# Patient Record
Sex: MALE | Race: WHITE | NOT HISPANIC OR LATINO | Employment: FULL TIME | URBAN - METROPOLITAN AREA
[De-identification: names, ages, dates, MRNs, and addresses within clinical notes are randomized per-mention and may not be internally consistent; named-entity substitution may affect disease eponyms.]

---

## 2019-03-21 ENCOUNTER — OFFICE VISIT (OUTPATIENT)
Dept: URGENT CARE | Facility: URGENT CARE | Age: 49
End: 2019-03-21
Payer: COMMERCIAL

## 2019-03-21 VITALS
BODY MASS INDEX: 29.45 KG/M2 | WEIGHT: 180 LBS | DIASTOLIC BLOOD PRESSURE: 85 MMHG | SYSTOLIC BLOOD PRESSURE: 134 MMHG | TEMPERATURE: 98.3 F | HEART RATE: 101 BPM | OXYGEN SATURATION: 97 %

## 2019-03-21 DIAGNOSIS — H57.89 REDNESS OF LEFT EYE: Primary | ICD-10-CM

## 2019-03-21 PROCEDURE — 99213 OFFICE O/P EST LOW 20 MIN: CPT | Performed by: FAMILY MEDICINE

## 2019-03-22 NOTE — PROGRESS NOTES
SUBJECTIVE:     Chief Complaint   Patient presents with     Urgent Care     Conjunctivitis     c/o pink eye for 1 day     History of Present Illness:  Keshawn Smith is a 48 year old male who presents complaining of left eye symptoms including light sensitivity and feeling that something was in the eye initially since waking yesterday morning.  He checked and did not see anything in the eye.  Now more of a mild stinging feeling in the eye at times and it is red today.  No severe eye pain.   No vision changes noticed.  He does recall getting a finger in the eye about 5 days ago from either himself or his child, but no pain at that time, very mild poke.   Was not doing any wood or metal work.  Symptoms feel like they are improving since onset.   Last eye appointment several years ago  Associated Signs and Symptoms: mild uri symptoms.  Contact wearer : No    ALLERGIES:  No known drug allergies    ROS:  Review of systems negative except as stated above.    OBJECTIVE:  /85   Pulse 101   Temp 98.3  F (36.8  C) (Tympanic)   Wt 81.6 kg (180 lb)   SpO2 97%   BMI 29.45 kg/m    General: awake, alert and in no acute distress  EYES:  PERRL, EOMI. No periorbital cellulitis.     Right - benign appearance  Left - generalized erythematous injection, no discharge.  Eyelids benign.  Numbing drop does relieve discomfort.      FLUoROSCEIN DYE exam:  There is pickup of a wavy broken line noted over the iris, just medial to the pupil.  No foreign body noted.       ASSESSMENT/ PLAN    ICD-10-CM    1. Redness of left eye H57.89 EYE EXAM (SIMPLE-NONBILLABLE)     Concern for possible viral eye infection.  Less likely related to finger to eye 5 days ago that did not cause any discomfort at the time of injury.  9 pm now -- discussed ER now vs eye clinic first thing in the morning.  He prefers to wait until morning.  Symptoms are improving since onset, but still light sensitivity and irregular defect on cornea are  concerning.    Patient Instructions   Call/Go to your eye clinic first thing tomorrow morning for evaluation.   If any worsening symptoms develop overnight, go to the ER immediately.

## 2019-03-22 NOTE — NURSING NOTE
VISION   Wears glasses: worn for testing  Tool used: HOTV   Right eye:        10/12.5 (20/25)  Left eye:          10/12.5 (20/25)  Visual Acuity: Pass  Yue Cantu MA

## 2019-03-22 NOTE — PATIENT INSTRUCTIONS
Call/Go to your eye clinic first thing tomorrow morning for evaluation.   If any worsening symptoms develop overnight, go to the ER immediately.

## 2019-11-04 ENCOUNTER — HEALTH MAINTENANCE LETTER (OUTPATIENT)
Age: 49
End: 2019-11-04

## 2020-11-16 ENCOUNTER — HEALTH MAINTENANCE LETTER (OUTPATIENT)
Age: 50
End: 2020-11-16

## 2021-09-18 ENCOUNTER — HEALTH MAINTENANCE LETTER (OUTPATIENT)
Age: 51
End: 2021-09-18

## 2022-01-08 ENCOUNTER — HEALTH MAINTENANCE LETTER (OUTPATIENT)
Age: 52
End: 2022-01-08

## 2022-11-20 ENCOUNTER — HEALTH MAINTENANCE LETTER (OUTPATIENT)
Age: 52
End: 2022-11-20

## 2023-04-15 ENCOUNTER — HEALTH MAINTENANCE LETTER (OUTPATIENT)
Age: 53
End: 2023-04-15

## 2023-10-20 ENCOUNTER — IMMUNIZATION (OUTPATIENT)
Dept: FAMILY MEDICINE | Facility: CLINIC | Age: 53
End: 2023-10-20
Payer: COMMERCIAL

## 2023-10-20 DIAGNOSIS — Z23 ENCOUNTER FOR IMMUNIZATION: Primary | ICD-10-CM

## 2023-10-20 PROCEDURE — 99207 PR NO CHARGE NURSE ONLY: CPT

## 2023-10-20 PROCEDURE — 90682 RIV4 VACC RECOMBINANT DNA IM: CPT

## 2023-10-20 PROCEDURE — 91320 SARSCV2 VAC 30MCG TRS-SUC IM: CPT

## 2023-10-20 PROCEDURE — 90480 ADMN SARSCOV2 VAC 1/ONLY CMP: CPT

## 2023-10-20 PROCEDURE — 90471 IMMUNIZATION ADMIN: CPT

## 2023-10-20 NOTE — PROGRESS NOTES
Prior to immunization administration, verified patients identity using patient s name and date of birth. Please see Immunization Activity for additional information.     Screening Questionnaire for Adult Immunization    Are you sick today?   No   Do you have allergies to medications, food, a vaccine component or latex?   No   Have you ever had a serious reaction after receiving a vaccination?   No   Do you have a long-term health problem with heart, lung, kidney, or metabolic disease (e.g., diabetes), asthma, a blood disorder, no spleen, complement component deficiency, a cochlear implant, or a spinal fluid leak?  Are you on long-term aspirin therapy?   No   Do you have cancer, leukemia, HIV/AIDS, or any other immune system problem?   No   Do you have a parent, brother, or sister with an immune system problem?   No   In the past 3 months, have you taken medications that affect  your immune system, such as prednisone, other steroids, or anticancer drugs; drugs for the treatment of rheumatoid arthritis, Crohn s disease, or psoriasis; or have you had radiation treatments?   No   Have you had a seizure, or a brain or other nervous system problem?   No   During the past year, have you received a transfusion of blood or blood    products, or been given immune (gamma) globulin or antiviral drug?   No   For women: Are you pregnant or is there a chance you could become       pregnant during the next month?   No   Have you received any vaccinations in the past 4 weeks?   No     Immunization questionnaire answers were all negative.    I have reviewed the following standing orders:   This patient is due and qualifies for the Covid-19 vaccine.     Click here for COVID-19 Standing Order    I have reviewed the vaccines inclusion and exclusion criteria; No concerns regarding eligibility.     This patient is due and qualifies for the Influenza vaccine.    Click here for Influenza Vaccine Standing Order    I have reviewed the  vaccines inclusion and exclusion criteria; No concerns regarding eligibility.     Patient instructed to remain in clinic for 15 minutes afterwards, and to report any adverse reactions.     Screening performed by Zachary Varma MA on 10/20/2023 at 10:31 AM.

## 2024-06-16 ENCOUNTER — HEALTH MAINTENANCE LETTER (OUTPATIENT)
Age: 54
End: 2024-06-16

## 2024-11-15 ENCOUNTER — OFFICE VISIT (OUTPATIENT)
Dept: URGENT CARE | Facility: URGENT CARE | Age: 54
End: 2024-11-15
Payer: COMMERCIAL

## 2024-11-15 VITALS
DIASTOLIC BLOOD PRESSURE: 93 MMHG | OXYGEN SATURATION: 98 % | HEART RATE: 72 BPM | SYSTOLIC BLOOD PRESSURE: 131 MMHG | TEMPERATURE: 98.3 F

## 2024-11-15 DIAGNOSIS — R05.2 SUBACUTE COUGH: Primary | ICD-10-CM

## 2024-11-15 PROCEDURE — 99203 OFFICE O/P NEW LOW 30 MIN: CPT | Performed by: PHYSICIAN ASSISTANT

## 2024-11-15 RX ORDER — ALBUTEROL SULFATE 90 UG/1
2 INHALANT RESPIRATORY (INHALATION) EVERY 6 HOURS PRN
Qty: 18 G | Refills: 0 | Status: SHIPPED | OUTPATIENT
Start: 2024-11-15

## 2024-11-15 RX ORDER — BENZONATATE 100 MG/1
100 CAPSULE ORAL 3 TIMES DAILY PRN
Qty: 30 CAPSULE | Refills: 0 | Status: SHIPPED | OUTPATIENT
Start: 2024-11-15

## 2024-11-15 ASSESSMENT — ENCOUNTER SYMPTOMS
HEADACHES: 0
COUGH: 1
FEVER: 0
WHEEZING: 0
RHINORRHEA: 1
CHEST TIGHTNESS: 0
SHORTNESS OF BREATH: 0
SINUS PAIN: 0
SINUS PRESSURE: 0
CHILLS: 0

## 2024-11-15 NOTE — PATIENT INSTRUCTIONS
Cough  For productive cough I suggest using over the counter medications such as guaifenesin (Mucinex). Mucinex will reduce the viscosity of mucus secretions and help with productive cough. Drink plenty of fluids while on Mucinex.   You can also take the cough suppressant I prescribed, tessalon perles at night to suppress the cough. Follow up in clinic if symptoms persist or worsen.     Albuterol inhaler  Use every 4-6 hours as needed for wheezing and shortness of breath  It is best to stand up, and tilt your chin up  If you feel you are not getting in the medication in effectively consider purchasing a 'space chamber' to help you take in your medication.

## 2024-11-15 NOTE — PROGRESS NOTES
Assessment & Plan        1. Subacute cough (Primary)    -Lungs CTAB  -viral cough  - albuterol (PROAIR HFA/PROVENTIL HFA/VENTOLIN HFA) 108 (90 Base) MCG/ACT inhaler; Inhale 2 puffs into the lungs every 6 hours as needed for shortness of breath, wheezing or cough.  Dispense: 18 g; Refill: 0  - benzonatate (TESSALON) 100 MG capsule; Take 1 capsule (100 mg) by mouth 3 times daily as needed for cough. Can take 2 capsules as needed 3 times daily  Dispense: 30 capsule; Refill: 0      Patient Instructions   Cough  For productive cough I suggest using over the counter medications such as guaifenesin (Mucinex). Mucinex will reduce the viscosity of mucus secretions and help with productive cough. Drink plenty of fluids while on Mucinex.   You can also take the cough suppressant I prescribed, tessalon perles at night to suppress the cough. Follow up in clinic if symptoms persist or worsen.     Albuterol inhaler  Use every 4-6 hours as needed for wheezing and shortness of breath  It is best to stand up, and tilt your chin up  If you feel you are not getting in the medication in effectively consider purchasing a 'space chamber' to help you take in your medication.         Return if symptoms worsen or fail to improve, for Follow up.    At the end of the encounter, I discussed results, diagnosis, medications. Discussed red flags for immediate return to clinic/ER, as well as indications for follow up if no improvement. Patient understood and agreed to plan. Patient was stable for discharge.    Rhoda Liao is a 54 year old male who presents to clinic today for the following health issues:  Chief Complaint   Patient presents with    Urgent Care     - 1 Month  - Cough  - Cough Drops for symptoms      HPI    Patient reports cough and runny nose for over 1 month.  He reports cough is dry, also notes postnasal drip.  Cough is worse in the morning.  Sometimes he feels the need to cough up mucus stuck in his throat.  He denies  fever, chills, shortness of breath, chest pain or chest tightness.  No history of asthma, COPD.  He has been taking cough drops which helped temporarily.    Review of Systems   Constitutional:  Negative for chills and fever.   HENT:  Positive for rhinorrhea. Negative for sinus pressure and sinus pain.    Respiratory:  Positive for cough. Negative for chest tightness, shortness of breath and wheezing.    Neurological:  Negative for headaches.       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  2005: Sprain and strain of shoulder and upper arm  2002: Mixed hyperlipidemia      Past Medical History:   Diagnosis Date    Dermatophytosis of nail     Genital herpes, unspecified     Mixed hyperlipidemia        Social History     Tobacco Use    Smoking status: Former     Current packs/day: 0.00     Types: Cigarettes     Quit date: 2001     Years since quittin.0    Smokeless tobacco: Never   Substance Use Topics    Alcohol use: Yes     Comment: 1-2x/month           Objective    BP (!) 131/93   Pulse 72   Temp 98.3  F (36.8  C) (Tympanic)   SpO2 98%   Physical Exam  Constitutional:       Appearance: Normal appearance.   HENT:      Head: Normocephalic.      Mouth/Throat:      Mouth: Mucous membranes are moist.      Pharynx: Oropharynx is clear. Uvula midline. No posterior oropharyngeal erythema.   Cardiovascular:      Rate and Rhythm: Normal rate and regular rhythm.   Pulmonary:      Effort: Pulmonary effort is normal.      Breath sounds: Normal breath sounds.   Lymphadenopathy:      Head:      Right side of head: No submental, submandibular or tonsillar adenopathy.      Left side of head: No submental, submandibular or tonsillar adenopathy.      Cervical: No cervical adenopathy.      Right cervical: No superficial cervical adenopathy.     Left cervical: No superficial cervical adenopathy.   Skin:     General: Skin is warm and dry.      Findings: No rash.    Neurological:      Mental Status: He is alert.   Psychiatric:         Mood and Affect: Mood normal.         Behavior: Behavior normal.              Danni Burnette PA-C

## 2025-06-21 ENCOUNTER — HEALTH MAINTENANCE LETTER (OUTPATIENT)
Age: 55
End: 2025-06-21